# Patient Record
Sex: FEMALE | Race: WHITE | Employment: OTHER | URBAN - NONMETROPOLITAN AREA
[De-identification: names, ages, dates, MRNs, and addresses within clinical notes are randomized per-mention and may not be internally consistent; named-entity substitution may affect disease eponyms.]

---

## 2022-11-09 ENCOUNTER — HOSPITAL ENCOUNTER (OUTPATIENT)
Dept: PHYSICAL THERAPY | Age: 82
Setting detail: THERAPIES SERIES
Discharge: HOME | End: 2022-11-09
Payer: MEDICARE

## 2022-11-09 PROCEDURE — 97165 OT EVAL LOW COMPLEX 30 MIN: CPT

## 2022-11-09 PROCEDURE — 97535 SELF CARE MNGMENT TRAINING: CPT

## 2022-11-09 NOTE — DISCHARGE SUMMARY
[address]  DRIVING EVALUATION    PATIENT: Gerson Henson  YOB: 1940  GENDER:  female  CSN: [id number]   REFERRING PHYSICIAN:   TIM Oseguera  DIAGNOSIS:  complaints of memory disturbance  DRIVING HISTORY:    Monica is a currently licensed . Has not driven for at least 1 year following a stroke. [place] Currently her  is the principal . Has children that live in [place], [place], [place], and [place]. Wants to return to driving. PMH: Please see medical history questionnaire for past medical history, allergies, and medications. PATIENT GOALS:    return to driving. OBJECTIVE  VISUAL SKILLS(using the [employer] 2000 Visual Evaluator)       FAR VISUAL ACUITY:   Pass. 20/40 R and L with glasses     STEREO DEPTH:   Pass. FUSION:    Pass. PERIPHERAL VISION:  Pass. 3/3 identified bilaterally at 55, 70 and 85 degree peripheral angles. DYNAVISION TESTIN hits in 60 second self paced trial.  Slow for driving. At least 45 hits is considered [facility] for driving. PHYSICAL SKILLS  RANGE OF MOTION:Within functional limits for driving  STRENGTH: Some RLE weakness is noted, at times did not clear accelerator to brake and had foot on both pedals at the same time. TRANSFER IN/OUT OF SIMULATOR: Within functional limits for driving  AMBULATION: Within functional limits for driving    IN VEHICLE MANIPULATION SKILLS:  Steering Wheel:Within functional limits for driving   Gas Pedal:  Impaired: some RLE weakness is noted, at times did not clear accelerator to brake and foot on both pedals at the same time. Brake Pedal: Impaired: see above comments.   Turn Signal:  not test ed  Seat Belt: not tested     COGNITIVE SKILLS  ORIENTATION:  Within functional limits for driving  ATTENTION SPAN:Within functional limits for driving  FRUSTRATION TOLERANCE:Within functional limits for driving  IMPULSIVITY:Within functional limits for driving  DIRECTION FOLLOWING:Within functional limits for driving  R/L DISCRIMINATION:Within functional limits for driving  MEMORY:Within functional limits for driving  JUDGMENT: Within functional limits for driving    COGNITIVE TESTING:     SHORT BLESSED TEST:   The Short Blessed Test is a screening tool to assess orientation, short term memory, long term memory, and reversal of learning. Overall this patient received a score of  4 which indicates normal cognition. 2 errors with reversal of learning. Trail Making Test A - completed in 46 seconds, no errors. Clock Drawing Test - 3/4    SIMULATED DRIVING ASSESSMENT:  WARM-UP:    (54 MPH, [address] with several curves. Light on-coming traffic. There are no intersections, pedestrians, cross traffic, slow traffic, etc.)    Total off road crashes: 0 (Good performance is 0)   Total collisions with vehicles and roadway objects: 0 (Good performance is 0)   Percentage of time over the posted speed limit: 0% (Good performance is within +/- 5% of the posted speed limit.)   Percentage of time out of lanes: 12% (Good performance would be less than 5%, moderate is less than 10%, greater than 10% is considered poor.)      BRAKE REACTION TIME:  (The brake reaction time test consists of presenting a large stop sign in the center of the visual display.  The appropriate response if for the  to release the gas and apply the brakes as quickly as possible.)     Total pedal reaction time: 1.1 seconds (Average value is below 0.7 seconds.)   Gas pedal reaction time: 0.7 seconds (For good performance, this should be less than 0.4 seconds; poor performance is above 0.55 seconds)   Stopping distance: 229 feet (Good performance is less than 175 feet, moderate is between 175 and 220 feet, greater than 220 feet is considered poor.)   Speed at stimulus: 48 MPH      SIMULATED DRIVING TREATMENT:    BASIC VEHICLE CONTROL:  ([place], 36 MPH road. There are 4-way stops with minimal cross traffic and signalized intersections with lights that change from green to red. There are some pedestrians at the final intersection.)   Total number of off road crashes: 0 (Good performance is 0.)   Total number of collisions with vehicles and other roadway objects: 0 (Good performance is 0.)   Total number of traffic light tickets: 0 (Good performance is 0.)   Total number of stop sign tickets: 0 (Good performance is 0.)   Percentage of time over the posted speed limit: 8.5% (Good performance would be less than 5%, moderate would be less than 10% and anything greater than 10% would be poor)   Percentage of time out of lanes: 20% (Good performance would be less than 1%, moderate would be less than 2.5% and anything greater than 2.5% would be poor)   Number of correctly negotiated intersections: 2   Number of incorrectly negotiated intersections: 3 (Good performance is 0.)   Notes: At second stop sign, Lesli Collier did not stop in time and stopped in the middle of the intersection. At the 1st stoplight, she stopped several feet back from the intersection. At the second stoplight, she did not stop at the tiffany dilemma. [address]:  ([address] and school zones  by curved roadway sections. Hazards include: pedestrians, cross traffic not stopping at unmarked intersections, and vehicles backing out of drives.)   Total number of off road crashes: 0 (Good performance is 0.)   Total number of collisions with vehicles and other roadway objects: 0 (Good performance is 0.)   Total number of traffic light tickets: 0 (Good performance is 0.)   Total number of stop sign tickets: 0 (Good performance is 0.)   Percentage of time over the posted speed limit: 5% (Good performance would be less than 5%, moderate would be less than 10% and anything greater than 10% would be poor)   Collision with cross traffic vehicles?  No   Time to collision with cross traffic vehicles: 12 seconds  (Average value is at least 1 second)   Collision with backing vehicle? No   Time to collision with backing vehicle: 1.2 seconds  (Average value is at least 1 second)   Collision with pedestrian or animal? No   Time to collision with pedestrian or animal: 1.8 seconds (Average value is at least 1 second)   Did the  exceed the posted speed limit in school zone? No      ASSESSMENT AND PLAN    RESULTS: Patient tested as questionable to return to independent driving. Berto Jackson demonstrated slow reaction time during reaction time testing, however, during simulated threat recognition testing, she demonstrated adequate reaction time scores, and she also improved her response time when approaching an intersection. RECOMMENDATIONS:   On the road testing with [address] [place] [address] at [phone], or with an alternative licensed on the road  of patient's choice  Patient has been instructed to contact referring physician to discuss results of this evaluation. Patient has been informed that his or her physician is responsible for making the final decision regarding driving status.  Thank you for this referral.      Time in: 1000  Time out: 1100  Timed treatment: 30 minutes  Total time: 60 minutes          True QUICK/L, [address]